# Patient Record
Sex: MALE | URBAN - METROPOLITAN AREA
[De-identification: names, ages, dates, MRNs, and addresses within clinical notes are randomized per-mention and may not be internally consistent; named-entity substitution may affect disease eponyms.]

---

## 2021-11-10 ENCOUNTER — OFFICE VISIT (OUTPATIENT)
Dept: URBAN - METROPOLITAN AREA CLINIC 13 | Facility: CLINIC | Age: 71
End: 2021-11-10
Payer: COMMERCIAL

## 2021-11-10 DIAGNOSIS — H35.3122 NEXDTVE AGE-RELATED MCLR DEGN, LEFT EYE, INTERMED DRY STAGE: ICD-10-CM

## 2021-11-10 PROCEDURE — 99204 OFFICE O/P NEW MOD 45 MIN: CPT | Performed by: OPHTHALMOLOGY

## 2021-11-10 PROCEDURE — 92134 CPTRZ OPH DX IMG PST SGM RTA: CPT | Performed by: OPHTHALMOLOGY

## 2021-11-10 PROCEDURE — 67028 INJECTION EYE DRUG: CPT | Performed by: OPHTHALMOLOGY

## 2021-11-10 ASSESSMENT — INTRAOCULAR PRESSURE
OD: 16
OS: 19

## 2021-11-10 NOTE — IMPRESSION/PLAN
Impression: Exdtve age-rel mclr degn, right eye, with actv chrdl neovas: H88.6371.
- h/o Eylea (last [facility], 9/2021) Plan: Exam and OCT demonstrate persistent SRF on the current treatment regimen. Currently, it appears that there is persistent activity of the CNVM. The risk of vision loss with the current treatment regiment was discussed with the patient. Discussed R/B/A of PDT vs antiVEGF vs observation. Recommend Avastin today and switch to intravitreal Eylea injection at the next visit given history of failure with Avastin. Intravitreal Avastin injection was administered today without complication. RTC 4 weeks for re-eval Eylea OD, OCT OU. Sooner if any issues arise.

## 2022-01-11 ENCOUNTER — OFFICE VISIT (OUTPATIENT)
Dept: URBAN - METROPOLITAN AREA CLINIC 13 | Facility: CLINIC | Age: 72
End: 2022-01-11
Payer: COMMERCIAL

## 2022-01-11 DIAGNOSIS — Z96.1 PRESENCE OF INTRAOCULAR LENS: ICD-10-CM

## 2022-01-11 PROCEDURE — 67028 INJECTION EYE DRUG: CPT | Performed by: OPHTHALMOLOGY

## 2022-01-11 PROCEDURE — 92012 INTRM OPH EXAM EST PATIENT: CPT | Performed by: OPHTHALMOLOGY

## 2022-01-11 PROCEDURE — 92134 CPTRZ OPH DX IMG PST SGM RTA: CPT | Performed by: OPHTHALMOLOGY

## 2022-01-11 ASSESSMENT — INTRAOCULAR PRESSURE
OD: 15
OS: 17

## 2022-01-11 NOTE — IMPRESSION/PLAN
Impression: Nexdtve age-related mclr degn, left eye, intermed dry stage: H35.3122. Plan: Patient notes distortion in vision. Exam and OCT demonstrate stable drusen and RPE changes confirming AMD is still dry. The patient was advised to continue AREDS 2 vitamin supplements; the risk of smoking was emphasized with the patient. The patient was also advised to continue to use an [address] to monitor the vision and to call immediately with any changes. Will closely monitor given suspicious findings on OCT.

## 2022-01-11 NOTE — IMPRESSION/PLAN
Impression: Exdtve age-rel mclr degn, right eye, with actv chrdl neovas: N62.6336.
- h/o Eylea (last [facility], 9/2021)
- h/o Avastin 11/10/2021 Plan: Exam and OCT demonstrate persistent SRF on the current treatment regimen. Currently, it appears that there is persistent activity of the CNVM. The risk of vision loss with the current treatment regiment was discussed with the patient. Discussed R/B/A of PDT vs antiVEGF vs observation. Recommend switch to intravitreal Eylea injection today. Intravitreal Eylea injection was administered today without complication. RTC 4 weeks for re-eval Eylea OD, OCT OU. Sooner if any issues arise.

## 2022-02-16 ENCOUNTER — OFFICE VISIT (OUTPATIENT)
Dept: URBAN - METROPOLITAN AREA CLINIC 13 | Facility: CLINIC | Age: 72
End: 2022-02-16
Payer: COMMERCIAL

## 2022-02-16 DIAGNOSIS — H35.3211 EXDTVE AGE-REL MCLR DEGN, RIGHT EYE, WITH ACTV CHRDL NEOVAS: Primary | ICD-10-CM

## 2022-02-16 DIAGNOSIS — H43.813 VITREOUS DEGENERATION, BILATERAL: ICD-10-CM

## 2022-02-16 PROCEDURE — 67028 INJECTION EYE DRUG: CPT | Performed by: OPHTHALMOLOGY

## 2022-02-16 PROCEDURE — 92134 CPTRZ OPH DX IMG PST SGM RTA: CPT | Performed by: OPHTHALMOLOGY

## 2022-02-16 PROCEDURE — 92012 INTRM OPH EXAM EST PATIENT: CPT | Performed by: OPHTHALMOLOGY

## 2022-02-16 ASSESSMENT — INTRAOCULAR PRESSURE
OS: 17
OD: 19

## 2022-02-16 NOTE — IMPRESSION/PLAN
Impression: Nexdtve age-related mclr degn, left eye, intermed dry stage: H35.3122. Plan: Patient notes distortion in vision. Exam and OCT demonstrate stable drusen and RPE changes confirming AMD is still dry. The patient was advised to continue AREDS 2 vitamin supplements; the risk of smoking was emphasized with the patient. The patient was also advised to continue to use an [address] to monitor the vision and to call immediately with any changes. 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

## 2022-02-16 NOTE — IMPRESSION/PLAN
Impression: Exdtve age-rel mclr degn, right eye, with actv chrdl neovas: D96.2927.
- h/o Eylea (last [facility], 9/2021)
- h/o Avastin 11/10/2021
-s/p Eylea last 01/11/2022 Plan: Exam and OCT demonstrate PED with improved SRF after switch to [facility]. Discussed R/B/A of PDT vs antiVEGF vs observation. Recommend  intravitreal Eylea injection today and extend to 6 wks. Intravitreal Eylea injection was administered today without complication. RTC 6 weeks for re-eval Eylea OD, OCT OU.   Sooner if any issues arise. (will be last visit before going back to [place])